# Patient Record
Sex: MALE | URBAN - METROPOLITAN AREA
[De-identification: names, ages, dates, MRNs, and addresses within clinical notes are randomized per-mention and may not be internally consistent; named-entity substitution may affect disease eponyms.]

---

## 2017-06-05 ENCOUNTER — HISTORICAL (OUTPATIENT)
Dept: ADMINISTRATIVE | Facility: HOSPITAL | Age: 52
End: 2017-06-05

## 2022-04-11 ENCOUNTER — HISTORICAL (OUTPATIENT)
Dept: ADMINISTRATIVE | Facility: HOSPITAL | Age: 57
End: 2022-04-11

## 2022-04-28 VITALS
WEIGHT: 177.5 LBS | HEIGHT: 68 IN | DIASTOLIC BLOOD PRESSURE: 83 MMHG | BODY MASS INDEX: 26.9 KG/M2 | OXYGEN SATURATION: 98 % | SYSTOLIC BLOOD PRESSURE: 119 MMHG

## 2024-10-07 ENCOUNTER — OFFICE VISIT (OUTPATIENT)
Dept: URGENT CARE | Facility: CLINIC | Age: 59
End: 2024-10-07
Payer: COMMERCIAL

## 2024-10-07 VITALS
HEIGHT: 65 IN | BODY MASS INDEX: 27.49 KG/M2 | WEIGHT: 165 LBS | OXYGEN SATURATION: 98 % | HEART RATE: 67 BPM | TEMPERATURE: 98 F | SYSTOLIC BLOOD PRESSURE: 149 MMHG | DIASTOLIC BLOOD PRESSURE: 83 MMHG | RESPIRATION RATE: 18 BRPM

## 2024-10-07 DIAGNOSIS — M25.521 RIGHT ELBOW PAIN: Primary | ICD-10-CM

## 2024-10-07 DIAGNOSIS — T14.8XXA SKIN ABRASION: ICD-10-CM

## 2024-10-07 PROCEDURE — 99213 OFFICE O/P EST LOW 20 MIN: CPT | Mod: ,,, | Performed by: FAMILY MEDICINE

## 2024-10-07 RX ORDER — MUPIROCIN 20 MG/G
OINTMENT TOPICAL 3 TIMES DAILY
Qty: 15 G | Refills: 0 | Status: SHIPPED | OUTPATIENT
Start: 2024-10-07

## 2024-10-07 RX ORDER — OMEPRAZOLE 20 MG/1
1 CAPSULE, DELAYED RELEASE ORAL NIGHTLY
COMMUNITY

## 2024-10-07 RX ORDER — RIVAROXABAN 20 MG/1
20 TABLET, FILM COATED ORAL
COMMUNITY

## 2024-10-07 RX ORDER — MODAFINIL 200 MG/1
200 TABLET ORAL EVERY MORNING
COMMUNITY

## 2024-10-07 NOTE — PROGRESS NOTES
"Subjective:      Patient ID: Larry Camp is a 59 y.o. male.    Vitals:  height is 5' 5" (1.651 m) and weight is 74.8 kg (165 lb). His temperature is 98.2 °F (36.8 °C). His blood pressure is 149/83 (abnormal) and his pulse is 67. His respiration is 18 and oxygen saturation is 98%.     Chief Complaint: Fall     Patient is a 59 y.o. male who presents to urgent care with complaints of fall x Saturday - rt elbow and rt knee pain .    ROS :  Constitutional : No fever, no fatigue  Neck : Negative except HPI  Respiratory : No shortness of breath, no wheezing  Cardiovascular : No chest pain  Musculoskeletal : Negative except HPI  Integumentary : No rash, no abnormal lesion  Neurological : Negative for tingling numbness and weakness     Ramona:  59-year-old male present to clinic with concerns of right elbow pain.  States accidentally tripped and fell on Saturday in his house garage from standing height.  Scraped right knee anteriorly and laterally.  Continues to walk with full weight-bearing.  No head injury no loss of consciousness.  Currently on Xarelto, history of protein c deficiency  Objective:     Physical Exam  General : Alert and oriented, No apparent distress, Afebrile, appears comfortable sitting in the exam chair, clear speech and appropriate communication  Neck -: supple, Non Tender  Respiratory :Lungs are clear to auscultate, Breath sounds are equal  Cardiovascular : Normal rate, normal volume pulse bilateral  Musculoskeletal :  Gait appears normal.  Right knee anterior lateral:  Skin abrasion.  No concerns of redness or swelling.  Range of motion present.  Right elbow posteriorly mild redness, tender to palpate.  No visible skin abrasion.  Elbow range of motion present.  Integumentary : Warm, Dry   Neurologic : Alert and Oriented X 4, sensations intact, motor intact   Assessment:     1. Right elbow pain    2. Skin abrasion      Plan:   Considering the history of fall x-rays right elbow today.  Reviewed the " x-rays, noticed irregularity in one view.  Radiology final results will be monitored on reported.  Discussed in detail on contusion, rest, topical ice 3 to 4 times a day.  Pressure dressing like Ace wrap.  Tylenol for pain and discomfort.  For skin abrasion keep the area dry and clean, topical antibiotic cream.  Light dressing with antibiotic cream to avoid brushing with close.  At nighttime let it air.  For signs of infection call clinic will consider antibiotics.  Voiced understanding    Right elbow pain  -     XR ELBOW COMPLETE 3 VIEW RIGHT; Future; Expected date: 10/07/2024    Skin abrasion  -     mupirocin (BACTROBAN) 2 % ointment; Apply topically 3 (three) times daily.  Dispense: 15 g; Refill: 0

## 2024-10-07 NOTE — PATIENT INSTRUCTIONS
Considering the history of fall x-rays right elbow today.  Reviewed the x-rays, noticed irregularity in one view.  Radiology final results will be monitored on reported.  Discussed in detail on contusion, rest, topical ice 3 to 4 times a day.  Pressure dressing like Ace wrap.  Tylenol for pain and discomfort.  For skin abrasion keep the area dry and clean, topical antibiotic cream.  Light dressing with antibiotic cream to avoid brushing with close.  At nighttime let it air.  For signs of infection call clinic will consider antibiotics.  Voiced understanding

## 2025-04-29 PROBLEM — Z76.89 ENCOUNTER TO ESTABLISH CARE WITH NEW DOCTOR: Status: ACTIVE | Noted: 2025-04-29

## 2025-04-29 NOTE — PROGRESS NOTES
"Establish Care       HPI:    Patient presents to establish care.  Patient has a long history of cervical myofascial pain at the junction of the base of his right neck and shoulder.  He has tried many modalities and seen many specialists.  He had a fusion in 2023 at the [facility].  Patient continues to be symptomatic.  Patient would like to try compounded semaglutide to see if this helps his symptoms; he was told this had anti-inflammatory properties.    Patient saw Dr. Crowell recently.  Labs done. Patient will be starting rosuvastatin 10 mg daily.         Current Outpatient Medications   Medication Instructions    CRESTOR 10 mg, Daily    modafiniL (PROVIGIL) 200 mg, Every morning    omeprazole (PRILOSEC OTC) 20 mg, Every morning    XARELTO 20 mg, Oral, Daily         ROS:    Patient with history of PE in 2016.  Workup revealed protein S deficiency.  Patient is on chronic Xarelto.      PE:    ..Visit Vitals  /84   Pulse 74   Temp 99.5 °F (37.5 °C)   Ht 5' 5" (1.651 m)   Wt 79.4 kg (175 lb)   SpO2 97%   BMI 29.12 kg/m²        General: He is well-developed well-nourished white male in no apparent distress.  He is alert and oriented.    Chest: Clear to auscultation bilaterally.    CV: Regular rate rhythm without murmurs rubs or gallops.        1. Encounter to establish care with new doctor  Overview:  04/30/2025: Patient presents to establish care.  Past medical history and medications discussed with patient.  Patient sees Dr. Crowell; saw him recently.  Patient will be starting rosuvastatin 10 mg daily.  Patient had labs done.  Patient had EGD done on 12/31/2024 with Dr Taylor Arce.   Last colonoscopy 4 years ago with Dr Taylor Arce.         2. Cervical myofascial pain syndrome  Overview:  04/30/2025: Patient with long history of cervical myofascial pain at the base of his right neck/right shoulder junction.  Patient has tried many modalities and seen many specialists.  Patient informed that compounded " semaglutide may have some anti-inflammatory properties; he would like to try this.  Prescription sent to  [facility] pharmacy      3. Pulmonary embolism, unspecified chronicity, unspecified pulmonary embolism type, unspecified whether acute cor pulmonale present  Overview:  04/30/2025:  Patient diagnosed with PE in 2016.    Workup revealed patient had protein S deficiency.    Patient is on chronic Xarelto.      4. Gastroesophageal reflux disease, unspecified whether esophagitis present  Overview:  04/30/2025:  Patient with history of reflux disease.  His symptoms are well controlled with omeprazole over-the-counter.      5. Obstructive sleep apnea syndrome  Overview:  04/30/2025:  Patient has been diagnosed with obstructive sleep apnea.    Patient uses a CPAP machine.      6. Protein S deficiency  Overview:  04/30/2025: Patient diagnosed with protein S deficiency in 2016 after having PE.    Patient is on chronic Xarelto.      Other orders  -     XARELTO 20 mg Tab; Take 1 tablet (20 mg total) by mouth once daily.              ..Follow up in about 1 year (around 4/30/2026).       Future Appointments   Date Time Provider Department Center   5/6/2026  8:00 AM Sebastian Heredia MD [facility]

## 2025-04-30 ENCOUNTER — OFFICE VISIT (OUTPATIENT)
Dept: PRIMARY CARE CLINIC | Facility: CLINIC | Age: 60
End: 2025-04-30
Payer: COMMERCIAL

## 2025-04-30 VITALS
HEIGHT: 65 IN | HEART RATE: 74 BPM | TEMPERATURE: 100 F | BODY MASS INDEX: 29.16 KG/M2 | DIASTOLIC BLOOD PRESSURE: 84 MMHG | WEIGHT: 175 LBS | OXYGEN SATURATION: 97 % | SYSTOLIC BLOOD PRESSURE: 128 MMHG

## 2025-04-30 DIAGNOSIS — M79.18 CERVICAL MYOFASCIAL PAIN SYNDROME: ICD-10-CM

## 2025-04-30 DIAGNOSIS — K21.9 GASTROESOPHAGEAL REFLUX DISEASE, UNSPECIFIED WHETHER ESOPHAGITIS PRESENT: ICD-10-CM

## 2025-04-30 DIAGNOSIS — Z76.89 ENCOUNTER TO ESTABLISH CARE WITH NEW DOCTOR: Primary | ICD-10-CM

## 2025-04-30 DIAGNOSIS — D68.59 PROTEIN S DEFICIENCY: ICD-10-CM

## 2025-04-30 DIAGNOSIS — I26.99 PULMONARY EMBOLISM, UNSPECIFIED CHRONICITY, UNSPECIFIED PULMONARY EMBOLISM TYPE, UNSPECIFIED WHETHER ACUTE COR PULMONALE PRESENT: ICD-10-CM

## 2025-04-30 DIAGNOSIS — G47.33 OBSTRUCTIVE SLEEP APNEA SYNDROME: ICD-10-CM

## 2025-04-30 RX ORDER — ROSUVASTATIN CALCIUM 10 MG/1
10 TABLET, FILM COATED ORAL DAILY
COMMUNITY
Start: 2025-03-19

## 2025-04-30 RX ORDER — OMEPRAZOLE 20 MG/1
20 TABLET, DELAYED RELEASE ORAL EVERY MORNING
COMMUNITY

## 2025-04-30 RX ORDER — RIVAROXABAN 20 MG/1
20 TABLET, FILM COATED ORAL DAILY
Start: 2025-04-30

## 2025-06-04 ENCOUNTER — TELEPHONE (OUTPATIENT)
Dept: PRIMARY CARE CLINIC | Facility: CLINIC | Age: 60
End: 2025-06-04
Payer: COMMERCIAL

## 2025-06-04 DIAGNOSIS — Z00.00 WELLNESS EXAMINATION: Primary | ICD-10-CM

## 2025-06-04 LAB
CHOL/HDLC RATIO: NORMAL
CHOLEST SERPL-MSCNC: 177 MG/DL (ref 0–200)
CHOLEST/HDLC SERPL: NORMAL {RATIO}
HBA1C MFR BLD: 5.7 % (ref 4–6)
HDL/CHOLESTEROL RATIO: NORMAL
HDLC SERPL-MCNC: 53 MG/DL (ref 35–70)
LDL CHOLESTEROL DIRECT: NORMAL
LDLC SERPL CALC-MCNC: 111 MG/DL (ref 0–160)
LDLC SERPL CALC-MCNC: NORMAL MG/DL
NONHDLC SERPL-MCNC: NORMAL MG/DL
TRIGL SERPL-MCNC: 50 MG/DL (ref 40–160)
VLDL CHOLESTEROL: NORMAL

## 2025-06-05 ENCOUNTER — PATIENT OUTREACH (OUTPATIENT)
Facility: CLINIC | Age: 60
End: 2025-06-05
Payer: COMMERCIAL

## 2025-07-24 ENCOUNTER — TELEPHONE (OUTPATIENT)
Dept: PRIMARY CARE CLINIC | Facility: CLINIC | Age: 60
End: 2025-07-24
Payer: COMMERCIAL

## 2025-07-24 DIAGNOSIS — R05.1 ACUTE COUGH: Primary | ICD-10-CM

## 2025-07-24 RX ORDER — CODEINE PHOSPHATE AND GUAIFENESIN 10; 100 MG/5ML; MG/5ML
5 SOLUTION ORAL EVERY 8 HOURS PRN
Qty: 120 ML | Refills: 0 | Status: SHIPPED | OUTPATIENT
Start: 2025-07-24

## 2025-07-24 RX ORDER — ALBUTEROL SULFATE 90 UG/1
2 INHALANT RESPIRATORY (INHALATION) EVERY 6 HOURS PRN
Qty: 8 G | Refills: 0 | Status: SHIPPED | OUTPATIENT
Start: 2025-07-24 | End: 2026-07-24

## 2025-07-24 NOTE — TELEPHONE ENCOUNTER
Patient reports that he has had a cough for about 4 days. He has tried tessalon perles and a z-mindy and it is not helping, he has been coughing ferociously. He would like to know if you think an inhaler would help? Should he come in or can we send out an inhaler?

## 2025-07-24 NOTE — TELEPHONE ENCOUNTER
I spoke to patient regarding his symptoms.  Prescriptions for Cheratussin and albuterol inhaler sent to pharmacy.  He is to call me on Monday with an update.  ER precautions given.

## 2025-07-24 NOTE — TELEPHONE ENCOUNTER
Copied from CRM #[id number]. Topic: General Inquiry - Patient Advice  >> Jul 24, 2025  9:24 AM Agustin wrote:  Who Called: Larry Camp    Caller is requesting assistance/information from provider's office.    Symptoms (please be specific): cough   How long has patient had these symptoms:  4 days  List of preferred pharmacies on file (remove unneeded): [unfilled]  If different, enter pharmacy into here including location and phone number:       Preferred Method of Contact: Phone Call  Patient's Preferred Phone Number on File: [phone]   Best Call Back Number, if different:  Additional Information: pt called to speak with nurse cough been going on for 4 days and needs advice pls advise